# Patient Record
Sex: FEMALE | Race: WHITE | NOT HISPANIC OR LATINO | URBAN - METROPOLITAN AREA
[De-identification: names, ages, dates, MRNs, and addresses within clinical notes are randomized per-mention and may not be internally consistent; named-entity substitution may affect disease eponyms.]

---

## 2017-02-25 PROBLEM — F98.8 ADD (ATTENTION DEFICIT DISORDER): Status: ACTIVE | Noted: 2017-02-25

## 2017-02-25 PROBLEM — F51.01 PRIMARY INSOMNIA: Status: ACTIVE | Noted: 2017-02-25

## 2017-02-25 PROBLEM — F39 MOOD DISORDER: Status: ACTIVE | Noted: 2017-02-25

## 2020-12-17 LAB
ABS NEUT (OLG): 6.33 X10(3)/MCL (ref 2.1–9.2)
APTT PPP: 29.9 SECOND(S) (ref 23.2–33.7)
BASOPHILS # BLD AUTO: 0.1 X10(3)/MCL (ref 0–0.2)
BASOPHILS NFR BLD AUTO: 1 %
EOSINOPHIL # BLD AUTO: 0.4 X10(3)/MCL (ref 0–0.9)
EOSINOPHIL NFR BLD AUTO: 3 %
ERYTHROCYTE [DISTWIDTH] IN BLOOD BY AUTOMATED COUNT: 12.3 % (ref 11.5–17)
HCT VFR BLD AUTO: 42.3 % (ref 37–47)
HGB BLD-MCNC: 12.9 GM/DL (ref 12–16)
INR PPP: 1 (ref 0–1.3)
LYMPHOCYTES # BLD AUTO: 2.7 X10(3)/MCL (ref 0.6–4.6)
LYMPHOCYTES NFR BLD AUTO: 25 %
MCH RBC QN AUTO: 29.1 PG (ref 27–31)
MCHC RBC AUTO-ENTMCNC: 30.5 GM/DL (ref 33–36)
MCV RBC AUTO: 95.3 FL (ref 80–94)
MONOCYTES # BLD AUTO: 1 X10(3)/MCL (ref 0.1–1.3)
MONOCYTES NFR BLD AUTO: 10 %
NEUTROPHILS # BLD AUTO: 6.33 X10(3)/MCL (ref 2.1–9.2)
NEUTROPHILS NFR BLD AUTO: 60 %
PLATELET # BLD AUTO: 373 X10(3)/MCL (ref 130–400)
PMV BLD AUTO: 10.8 FL (ref 9.4–12.4)
PROTHROMBIN TIME: 12.7 SECOND(S) (ref 11.1–13.7)
RBC # BLD AUTO: 4.44 X10(6)/MCL (ref 4.2–5.4)
WBC # SPEC AUTO: 10.5 X10(3)/MCL (ref 4.5–11.5)

## 2020-12-22 ENCOUNTER — HISTORICAL (OUTPATIENT)
Dept: SURGERY | Facility: HOSPITAL | Age: 30
End: 2020-12-22

## 2021-04-05 ENCOUNTER — HOSPITAL ENCOUNTER (OUTPATIENT)
Dept: MEDSURG UNIT | Facility: HOSPITAL | Age: 31
End: 2021-04-06
Attending: INTERNAL MEDICINE | Admitting: INTERNAL MEDICINE

## 2021-04-05 LAB
ABS NEUT (OLG): 4.4 X10(3)/MCL (ref 2.1–9.2)
ABS NEUT (OLG): 5.13 X10(3)/MCL (ref 2.1–9.2)
ALBUMIN SERPL-MCNC: 3.8 GM/DL (ref 3.5–5)
ALBUMIN SERPL-MCNC: 4.5 GM/DL (ref 3.5–5)
ALBUMIN/GLOB SERPL: 1 RATIO (ref 1.1–2)
ALBUMIN/GLOB SERPL: 1.1 RATIO (ref 1.1–2)
ALP SERPL-CCNC: 73 UNIT/L (ref 40–150)
ALP SERPL-CCNC: 88 UNIT/L (ref 40–150)
ALT SERPL-CCNC: 27 UNIT/L (ref 0–55)
ALT SERPL-CCNC: 27 UNIT/L (ref 0–55)
AMPHET UR QL SCN: POSITIVE
APAP SERPL-MCNC: <17.4 UG/ML (ref 17.4–30)
APPEARANCE, UA: CLEAR
AST SERPL-CCNC: 22 UNIT/L (ref 5–34)
AST SERPL-CCNC: 25 UNIT/L (ref 5–34)
B-HCG SERPL QL: NEGATIVE
BACTERIA #/AREA URNS AUTO: ABNORMAL /HPF
BARBITURATE SCN PRESENT UR: NEGATIVE
BASOPHILS # BLD AUTO: 0.1 X10(3)/MCL (ref 0–0.2)
BASOPHILS # BLD AUTO: 0.1 X10(3)/MCL (ref 0–0.2)
BASOPHILS NFR BLD AUTO: 1 %
BASOPHILS NFR BLD AUTO: 1 %
BENZODIAZ UR QL SCN: NEGATIVE
BILIRUB SERPL-MCNC: 0.3 MG/DL
BILIRUB SERPL-MCNC: 0.4 MG/DL
BILIRUB UR QL STRIP: NEGATIVE
BILIRUBIN DIRECT+TOT PNL SERPL-MCNC: 0.1 MG/DL (ref 0–0.8)
BILIRUBIN DIRECT+TOT PNL SERPL-MCNC: 0.2 MG/DL (ref 0–0.5)
BILIRUBIN DIRECT+TOT PNL SERPL-MCNC: 0.2 MG/DL (ref 0–0.5)
BILIRUBIN DIRECT+TOT PNL SERPL-MCNC: 0.2 MG/DL (ref 0–0.8)
BUN SERPL-MCNC: 4.3 MG/DL (ref 7–18.7)
BUN SERPL-MCNC: 6.1 MG/DL (ref 7–18.7)
CALCIUM SERPL-MCNC: 8.5 MG/DL (ref 8.4–10.2)
CALCIUM SERPL-MCNC: 9.7 MG/DL (ref 8.4–10.2)
CANNABINOIDS UR QL SCN: NEGATIVE
CHLORIDE SERPL-SCNC: 107 MMOL/L (ref 98–107)
CHLORIDE SERPL-SCNC: 109 MMOL/L (ref 98–107)
CK SERPL-CCNC: 87 U/L (ref 29–168)
CO2 SERPL-SCNC: 21 MMOL/L (ref 22–29)
CO2 SERPL-SCNC: 23 MMOL/L (ref 22–29)
COCAINE UR QL SCN: NEGATIVE
COLOR UR: NORMAL
CREAT SERPL-MCNC: 0.73 MG/DL (ref 0.55–1.02)
CREAT SERPL-MCNC: 0.85 MG/DL (ref 0.55–1.02)
EOSINOPHIL # BLD AUTO: 0.1 X10(3)/MCL (ref 0–0.9)
EOSINOPHIL # BLD AUTO: 0.3 X10(3)/MCL (ref 0–0.9)
EOSINOPHIL NFR BLD AUTO: 2 %
EOSINOPHIL NFR BLD AUTO: 4 %
ERYTHROCYTE [DISTWIDTH] IN BLOOD BY AUTOMATED COUNT: 13 % (ref 11.5–14.5)
ERYTHROCYTE [DISTWIDTH] IN BLOOD BY AUTOMATED COUNT: 13.2 % (ref 11.5–14.5)
ETHANOL SERPL-MCNC: 79 MG/DL
GLOBULIN SER-MCNC: 3.5 GM/DL (ref 2.4–3.5)
GLOBULIN SER-MCNC: 4.6 GM/DL (ref 2.4–3.5)
GLUCOSE (UA): NEGATIVE
GLUCOSE SERPL-MCNC: 120 MG/DL (ref 74–100)
GLUCOSE SERPL-MCNC: 86 MG/DL (ref 74–100)
HCT VFR BLD AUTO: 38 % (ref 35–46)
HCT VFR BLD AUTO: 44 % (ref 35–46)
HGB BLD-MCNC: 11.9 GM/DL (ref 12–16)
HGB BLD-MCNC: 14 GM/DL (ref 12–16)
HGB UR QL STRIP: NEGATIVE
HYALINE CASTS #/AREA URNS LPF: ABNORMAL /LPF
IMM GRANULOCYTES # BLD AUTO: 0.01 10*3/UL
IMM GRANULOCYTES # BLD AUTO: 0.03 10*3/UL
IMM GRANULOCYTES NFR BLD AUTO: 0 %
IMM GRANULOCYTES NFR BLD AUTO: 0 %
KETONES UR QL STRIP: NEGATIVE
LACTATE SERPL-SCNC: 1.32 MMOL/L (ref 0.5–2.2)
LEUKOCYTE ESTERASE UR QL STRIP: NEGATIVE
LYMPHOCYTES # BLD AUTO: 1.3 X10(3)/MCL (ref 0.6–4.6)
LYMPHOCYTES # BLD AUTO: 2.3 X10(3)/MCL (ref 0.6–4.6)
LYMPHOCYTES NFR BLD AUTO: 18 %
LYMPHOCYTES NFR BLD AUTO: 30 %
MAGNESIUM SERPL-MCNC: 2 MG/DL (ref 1.6–2.6)
MCH RBC QN AUTO: 29.4 PG (ref 26–34)
MCH RBC QN AUTO: 29.5 PG (ref 26–34)
MCHC RBC AUTO-ENTMCNC: 31.3 GM/DL (ref 31–37)
MCHC RBC AUTO-ENTMCNC: 31.8 GM/DL (ref 31–37)
MCV RBC AUTO: 92.4 FL (ref 80–100)
MCV RBC AUTO: 94.3 FL (ref 80–100)
MONOCYTES # BLD AUTO: 0.6 X10(3)/MCL (ref 0.1–1.3)
MONOCYTES # BLD AUTO: 0.6 X10(3)/MCL (ref 0.1–1.3)
MONOCYTES NFR BLD AUTO: 8 %
MONOCYTES NFR BLD AUTO: 9 %
NEUTROPHILS # BLD AUTO: 4.4 X10(3)/MCL (ref 2.1–9.2)
NEUTROPHILS # BLD AUTO: 5.13 X10(3)/MCL (ref 2.1–9.2)
NEUTROPHILS NFR BLD AUTO: 58 %
NEUTROPHILS NFR BLD AUTO: 71 %
NITRITE UR QL STRIP: NEGATIVE
OPIATES UR QL SCN: NEGATIVE
PCP UR QL: NEGATIVE
PH UR STRIP.AUTO: 5 [PH] (ref 3–11)
PH UR STRIP: 5 [PH] (ref 4.5–8)
PHOSPHATE SERPL-MCNC: 2.4 MG/DL (ref 2.3–4.7)
PLATELET # BLD AUTO: 304 X10(3)/MCL (ref 130–400)
PLATELET # BLD AUTO: 354 X10(3)/MCL (ref 130–400)
PMV BLD AUTO: 10.3 FL (ref 7.4–10.4)
PMV BLD AUTO: 10.7 FL (ref 7.4–10.4)
POTASSIUM SERPL-SCNC: 3.6 MMOL/L (ref 3.5–5.1)
POTASSIUM SERPL-SCNC: 3.8 MMOL/L (ref 3.5–5.1)
PROT SERPL-MCNC: 7.3 GM/DL (ref 6.4–8.3)
PROT SERPL-MCNC: 9.1 GM/DL (ref 6.4–8.3)
PROT UR QL STRIP: NEGATIVE
RBC # BLD AUTO: 4.03 X10(6)/MCL (ref 4–5.2)
RBC # BLD AUTO: 4.76 X10(6)/MCL (ref 4–5.2)
RBC #/AREA URNS AUTO: ABNORMAL /HPF
SALICYLATES SERPL-MCNC: <5 MG/DL
SARS-COV-2 AG RESP QL IA.RAPID: NEGATIVE
SODIUM SERPL-SCNC: 142 MMOL/L (ref 136–145)
SODIUM SERPL-SCNC: 142 MMOL/L (ref 136–145)
SP GR UR STRIP: 1.01 (ref 1–1.03)
SQUAMOUS #/AREA URNS LPF: ABNORMAL /LPF
TSH SERPL-ACNC: 2.05 UIU/ML (ref 0.35–4.94)
UROBILINOGEN UR STRIP-ACNC: NORMAL
WBC # SPEC AUTO: 7.2 X10(3)/MCL (ref 4.5–11)
WBC # SPEC AUTO: 7.6 X10(3)/MCL (ref 4.5–11)
WBC #/AREA URNS AUTO: ABNORMAL /HPF

## 2021-04-06 LAB
ABS NEUT (OLG): 3.45 X10(3)/MCL (ref 2.1–9.2)
ALBUMIN SERPL-MCNC: 3.8 GM/DL (ref 3.5–5)
ALBUMIN/GLOB SERPL: 1.1 RATIO (ref 1.1–2)
ALP SERPL-CCNC: 77 UNIT/L (ref 40–150)
ALT SERPL-CCNC: 30 UNIT/L (ref 0–55)
AST SERPL-CCNC: 26 UNIT/L (ref 5–34)
BASOPHILS # BLD AUTO: 0 X10(3)/MCL (ref 0–0.2)
BASOPHILS NFR BLD AUTO: 1 %
BILIRUB SERPL-MCNC: 0.5 MG/DL
BILIRUBIN DIRECT+TOT PNL SERPL-MCNC: 0.2 MG/DL (ref 0–0.5)
BILIRUBIN DIRECT+TOT PNL SERPL-MCNC: 0.3 MG/DL (ref 0–0.8)
BUN SERPL-MCNC: 5.2 MG/DL (ref 7–18.7)
CALCIUM SERPL-MCNC: 8.8 MG/DL (ref 8.4–10.2)
CHLORIDE SERPL-SCNC: 108 MMOL/L (ref 98–107)
CO2 SERPL-SCNC: 27 MMOL/L (ref 22–29)
CREAT SERPL-MCNC: 0.76 MG/DL (ref 0.55–1.02)
EOSINOPHIL # BLD AUTO: 0.2 X10(3)/MCL (ref 0–0.9)
EOSINOPHIL NFR BLD AUTO: 4 %
ERYTHROCYTE [DISTWIDTH] IN BLOOD BY AUTOMATED COUNT: 13.3 % (ref 11.5–14.5)
GLOBULIN SER-MCNC: 3.6 GM/DL (ref 2.4–3.5)
GLUCOSE SERPL-MCNC: 83 MG/DL (ref 74–100)
HCT VFR BLD AUTO: 38.8 % (ref 35–46)
HGB BLD-MCNC: 12.2 GM/DL (ref 12–16)
IMM GRANULOCYTES # BLD AUTO: 0.03 10*3/UL
IMM GRANULOCYTES NFR BLD AUTO: 0 %
LYMPHOCYTES # BLD AUTO: 1.6 X10(3)/MCL (ref 0.6–4.6)
LYMPHOCYTES NFR BLD AUTO: 27 %
MCH RBC QN AUTO: 29.6 PG (ref 26–34)
MCHC RBC AUTO-ENTMCNC: 31.4 GM/DL (ref 31–37)
MCV RBC AUTO: 94.2 FL (ref 80–100)
MONOCYTES # BLD AUTO: 0.6 X10(3)/MCL (ref 0.1–1.3)
MONOCYTES NFR BLD AUTO: 10 %
NEUTROPHILS # BLD AUTO: 3.45 X10(3)/MCL (ref 2.1–9.2)
NEUTROPHILS NFR BLD AUTO: 58 %
PLATELET # BLD AUTO: 292 X10(3)/MCL (ref 130–400)
PMV BLD AUTO: 10.5 FL (ref 7.4–10.4)
POTASSIUM SERPL-SCNC: 3.9 MMOL/L (ref 3.5–5.1)
PROT SERPL-MCNC: 7.4 GM/DL (ref 6.4–8.3)
RBC # BLD AUTO: 4.12 X10(6)/MCL (ref 4–5.2)
SODIUM SERPL-SCNC: 143 MMOL/L (ref 136–145)
WBC # SPEC AUTO: 6 X10(3)/MCL (ref 4.5–11)

## 2022-04-29 NOTE — OP NOTE
DATE OF SURGERY:    12/22/2020    SURGEON:  Weston Viera MD    PREOPERATIVE DIAGNOSES:    1. Chronic obstructive adenotonsillitis.  2. Chronic recurrent pansinusitis.  3. Diffuse intranasal and intrasinus polyposis.  4. Nasal septal deviation.    DESCRIPTION OF PROCEDURE:  With proper consent and information, the patient was brought to the operating room and placed on the operating table in the supine position.  After satisfactory endotracheal intubation and general anesthesia, the patient was placed asleep.  The nasopharynx was packed with 200 mg of cocaine.  The septum and sinuses were injected with approximately 2 cc of 2% Xylocaine with epinephrine.  A left hemitransfixion incision was made and the mucoperichondrium was elevated off both sides of the septum.  This was brought back to the perpendicular plate of the ethmoid.  The deviation of the nasal septum and perpendicular plate was removed, leaving a caudal and dorsal strut of approximately 1 cm.  The mucoperichondrium was reapproximated with 4-0 plain catgut and 5-0 chromic.     Attention was then placed to the sinuses.  The nose was explored endoscopically.  An infundibulotomy was done on both sides.  The anterior sinus was opened.  There was a significant amount of polypoid material and hypertrophic chronically infected mucosal membranes.  Anterior ethmoid was done.  The basal lamella was opened.  The posterior ethmoid sinuses were involved with chronic mucosal changes consistent with chronic sinusitis.  This was completely removed up to the fovea ethmoidalis.  That section was carried up superiorly into the frontal ethmoid recess.  The frontal sinus was explored and mucosal antral disease was removed from this area with a mucosal sparing technique.  The nasal frontal duct was connected to the posterior ethmoid sinus in the usual fashion.  The anterior wall of the sphenoid sinus dissection was carried inferiorly to the anterior wall of the  sphenoid sinus which was identified and the sphenoid ostium was opened.  There was mucosal antral disease and obstructive inflammatory changes and tissue in this area which was removed and cleaned out.  Using the backbiting forceps and ring curette, the maxillary sinuses were opened widely.  There was a large amount of purulent material and disease tissue that was removed.  This was all sent for pathologic evaluation.  An identical procedure was carried out on the opposite side.  Bilateral submucosal resection of the inferior turbinates was done sharply and all hypertrophic changes were removed.  The nasal sinuses were packed with Fibrin glue and Nasopore.  The patient tolerated the procedure very well.  There were no intraoperative bleeding problems or CSF leak.  The patient was     transferred back to the recovery room awake, alert and responsive.  Tissue was removed from all of the offending sinuses and sent for final pathologic specimen.        ______________________________  MD FRED Ray/SK  DD:  12/23/2020  Time:  09:49AM  DT:  12/23/2020  Time:  11:48AM  Job #:  [id number]

## 2022-04-29 NOTE — ED PROVIDER NOTES
Patient:   Katelin Newsome            MRN: [id number]            FIN: [id number]               Age:   31 years     Sex:  Female     :  1990   Associated Diagnoses:   Depression, major; Medication overdose; Suicidal ideations   Author:   Adam Keen MD      Basic Information   Time seen: Date & time 2021 02:12:00.   History source: Patient.   Arrival mode: Private vehicle.   History limitation: None.   Additional information: Chief Complaint from Nursing Triage Note : Chief Complaint   2021 1:49 CDT        Chief Complaint           SUICIDAL.  TOOK 3 CLONAZEPAM 0.5MGS TABS, 5 BUPROPION , TOOK 5 BUPROPRION XL 300MGS.  SAYS  THE  INSURANCE IS NOT PAYING FOR HER BIPOLAR MEDICATIONS  AND SHE HAS BEEN  FEELING  SUICIDAL FOR A WHILE. LETHARGIC IN TRIAGE AND DROPPED HER PHONE TWICE.  .   Provider/Visit info:   Time Seen:  Adam Keen MD / 2021 02:12  .   History of Present Illness   The patient presents with psychiatric problem, suicidal ideation and depression.  The onset was 2  weeks ago and gradual.  The course/duration of symptoms is constant.  Character of symptoms depressed suicidal thoughts.  Self injury: ingestion.  There are exacerbating factors including family problems and financial problems.  Risk factors consist of suicide risk.  Prior episodes: occasional.  Therapy today: none.  Associated symptoms: denies fever, denies chills, denies nausea and denies vomiting.  Additional history: eligible for legal hold.      31-year-old female presents emergency room with suicidal ideation.  Reports major depression, and has been unable to afford her bipolar medications.  Patient began taking her medications tonight, and continue taking her medication, not caring, and feeling more suicidal.  Patient took a total of 3 0.5 mg Klonopin's, [id number] Bupropion SR, [id number] Buproprion XL at 8:30pm last night (6 hrs prior to presentation). .        Review of Systems    Constitutional symptoms:  No fever, no chills.    Respiratory symptoms:  No shortness of breath, no orthopnea.    Cardiovascular symptoms:  No chest pain, no palpitations.    Gastrointestinal symptoms:  No abdominal pain, no nausea, no vomiting.    Genitourinary symptoms:  No dysuria, no hematuria.    Psychiatric symptoms:  Depression.             Additional review of systems information: All other systems reviewed and otherwise negative.      Health Status   Allergies:    Allergic Reactions (Selected)  Severity Not Documented  Penicillins- Hives.,    Allergies (1) Active Reaction  penicillins Hives  .   Medications:  (Selected)   Documented Medications  Documented  Viibryd 20 mg oral tablet: 20 mg = 1 tab(s), Oral, Daily  Vraylar 1.5 mg oral capsule: = 1 tab(s), Oral, Daily, 0 Refill(s).      Past Medical/ Family/ Social History   Medical history:    No active or resolved past medical history items have been selected or recorded., Reviewed as documented in chart.   Surgical history:    FESS Septo Turb Cautery (Bilateral) on 12/22/2020 at 30 Years.  Comments:  12/22/2020 11:32 Clarissa Olson RN  auto-populated from documented surgical case  Tonsil / Adenoid w/KTP Laser (Bilateral) on 12/22/2020 at 30 Years.  Comments:  12/22/2020 11:32 Clarissa Olson RN  auto-populated from documented surgical case  Vaginal hysterectomy, for uterus 250 g or less; ([id number])., Reviewed as documented in chart.   Family history:    No family history items have been selected or recorded., Reviewed as documented in chart.   Social history:    Social & Psychosocial Habits    Alcohol  12/18/2020  Use: Never    Substance Use  12/18/2020  Use: Never    Tobacco  12/22/2020  Use: 4 or less cigarettes(less    Patient Wants Consult For Cessation Counseling No    Abuse/Neglect  12/22/2020  SHX Any signs of abuse or neglect No    Feels unsafe at home: No    Safe place to go: Yes    Spiritual/Cultural  12/22/2020  Rastafari  Preference Moravian  , Reviewed as documented in chart.   Problem list:    Active Problems (5)  Bipolar   Obesity   Panic attacks   Tobacco user   Tonsillitis   .      Physical Examination               Vital Signs   Vital Signs   4/5/2021 1:49 CDT        Temperature Oral          36.3 DegC                             Temperature Oral (calculated)             97.34 DegF                             Peripheral Pulse Rate     128 bpm  HI                             Respiratory Rate          20 br/min                             SpO2                      97 %                             Oxygen Therapy            Room air                             Systolic Blood Pressure   95 mmHg                             Diastolic Blood Pressure  70 mmHg  .      Vital Signs (last 24 hrs)_____  Last Charted___________  Temp Oral     36.3 DegC  (APR 05 01:49)  Heart Rate Peripheral   H 128bpm  (APR 05 01:49)  Resp Rate         20 br/min  (APR 05 01:49)  SBP      95 mmHg  (APR 05 01:49)  DBP      70 mmHg  (APR 05 01:49)  SpO2      97 %  (APR 05 01:49)  .   Measurements   4/5/2021 1:49 CDT        Weight Dosing             95 kg                             Weight Measured and Calculated in Lbs     209.44 lb                             Weight Estimated          95 kg                             Height/Length Dosing      162.56 cm                             Height/Length Estimated   162.56 cm                             Body Mass Index Estimated 35.95 kg/m2  .   Basic Oxygen Information   4/5/2021 1:49 CDT        SpO2                      97 %                             Oxygen Therapy            Room air  .   General:  Alert, no acute distress.    Allison coma scale:  Eye response: 4 /4, verbal response: 5 /5, motor response: 5 /6, Total score: Total score: 15.    Neurological:  Alert and oriented to person, place, time, and situation, No focal neurological deficit observed.    Skin:  Intact, moist.    Head:  Normocephalic, atraumatic.     Neck:  Supple, trachea midline, no tenderness.    Eye:  Pupils are equal, round and reactive to light, extraocular movements are intact, normal conjunctiva.    Ears, nose, mouth and throat:  Oral mucosa moist, no pharyngeal erythema or exudate.    Cardiovascular:  Regular rate and rhythm, No murmur, Normal peripheral perfusion.    Respiratory:  Lungs are clear to auscultation, respirations are non-labored, breath sounds are equal, Symmetrical chest wall expansion.    Gastrointestinal:  Soft, Nontender, Non distended, Normal bowel sounds.    Psychiatric:  Cooperative, Mood and affect: Depressed, tearful, Abnormal / Psychotic thoughts: Suicidal.       Medical Decision Making   Rationale:  Due to suicidal ideation and attempted overdose, patient has been placed under a [facility] for inpatient psychiatric evaluation and treatment..   Documents reviewed:  Emergency department nurses' notes.   Results review:  Lab results : [facility]   4/5/2021 3:11 CDT        Est Creat Clearance Ser   84.33 mL/min    4/5/2021 2:13 CDT        Sodium Lvl                142 mmol/L                             Potassium Lvl             3.6 mmol/L                             Chloride                  107 mmol/L                             CO2                       21 mmol/L  LOW                             Calcium Lvl               9.7 mg/dL                             Glucose Lvl               86 mg/dL                             BUN                       6.1 mg/dL  LOW                             Creatinine                0.85 mg/dL                             eGFR-AA                   102                             eGFR-MICHAEL                  84 mL/min/1.73 m2  LOW                             Bili Total                0.3 mg/dL                             Bili Direct               0.2 mg/dL                             Bili Indirect             0.10 mg/dL                             AST                       25 unit/L                              ALT                       27 unit/L                             Alk Phos                  88 unit/L                             Total Protein             9.1 gm/dL  HI                             Albumin Lvl               4.5 gm/dL                             Globulin                  4.6 gm/dL  HI                             A/G Ratio                 1.0 ratio  LOW                             TSH                       2.0536 uIU/mL                             WBC                       7.6 x10(3)/mcL                             RBC                       4.76 x10(6)/mcL                             Hgb                       14.0 gm/dL                             Hct                       44.0 %                             Platelet                  354 x10(3)/mcL                             MCV                       92.4 fL                             MCH                       29.4 pg                             MCHC                      31.8 gm/dL                             RDW                       13.0 %                             MPV                       10.7 fL  HI                             Abs Neut                  4.40 x10(3)/mcL                             Neutro Auto               58 %  NA                             Lymph Auto                30 %  NA                             Mono Auto                 8 %  NA                             Eos Auto                  4 %  NA                             Abs Eos                   0.3 x10(3)/mcL                             Basophil Auto             1 %  NA                             Abs Neutro                4.40 x10(3)/mcL                             Abs Lymph                 2.3 x10(3)/mcL                             Abs Mono                  0.6 x10(3)/mcL                             Abs Baso                  0.1 x10(3)/mcL                             IG%                       0 %  NA                             IG#                       0.010  NA                              Acetaminoph Lvl           <17.4 ug/ml                             Salicylate Lvl            <5.0 mg/dL  NA                             Ethanol Lvl               79.0 mg/dL  NA                             HIV Rapid Scrn            Non-Reactive                             Spec Source               Oral Fluid    , Lab results : [facility]   4/5/2021 2:13 CDT        Total CK                  87 U/L    .       Reexamination/ Reevaluation   Time: 4/5/2021 03:29:00 .   Interventions: Order Profile (Selected)   Inpatient Orders  Completed  Magnesium Sulfate 2gm/50ml IVPB (Premix): 2 gm, form: Infusion, IV Piggyback, Now, Infuse over: 1 hr, first dose 04/05/21 3:29:00 CDT, stop date 04/05/21 3:29:00 CDT, STAT  .   Notes: Poison control recommends 2gm of Magnesium to be given.  .   Time: 4/5/2021 06:14:00 .   Notes: Poison control requests CPK level given intermittent tremors to evaluate for rhabdomyolysis.  .   Time: 4/5/2021 06:41:00 .   Notes: Patient in no distress, currently awaiting on CPK level.  Patient has intermittent hallucinations, though has remained awake and alert..      Impression and Plan   Diagnosis   Depression, major (COX66-AE F32.9)   Medication overdose (WFB07-CI T50.901A)   Suicidal ideations (HFX85-HT R45.851)   Plan   Condition: Stable.    Disposition: Patient care transitioned to: Time: 4/5/2021 07:00:00, Sebastián YOUNGBLOOD, Campos RADER, Dispositioned by: Time: 4/5/2021 03:17:00, Osmani YOUNGBLOOD, Adam BERUMEN, Transfer for inpatient psychiatric evaluation and treatment..    Counseled: Patient, Regarding diagnosis, Regarding treatment plan, Patient indicated understanding of instructions.

## 2022-04-29 NOTE — ED PROVIDER NOTES
Patient:   Katelin Newsome            MRN: [id number]            FIN: [id number]               Age:   31 years     Sex:  Female     :  1990   Associated Diagnoses:   Depression, major; Medication overdose; Suicidal ideations   Author:   Sebastián YOUNGBLOOD, Campos RADER      Basic Information   Time seen: Date & time 2021 02:12:00.   History source: Patient.   Arrival mode: Private vehicle.   History limitation: None.   Additional information: Chief Complaint from Nursing Triage Note : Chief Complaint   2021 1:49 CDT        Chief Complaint           SUICIDAL.  TOOK 3 CLONAZEPAM 0.5MGS TABS, 5 BUPROPION , TOOK 5 BUPROPRION XL 300MGS.  SAYS  THE  INSURANCE IS NOT PAYING FOR HER BIPOLAR MEDICATIONS  AND SHE HAS BEEN  FEELING  SUICIDAL FOR A WHILE. LETHARGIC IN TRIAGE AND DROPPED HER PHONE TWICE.  .   Provider/Visit info:   Time Seen:  Adam Keen MD / 2021 02:12  .   History of Present Illness   The patient presents with psychiatric problem, suicidal ideation and depression.  The onset was 2  weeks ago and gradual.  The course/duration of symptoms is constant.  Character of symptoms depressed suicidal thoughts.  Self injury: ingestion.  There are exacerbating factors including family problems and financial problems.  Risk factors consist of suicide risk.  Prior episodes: occasional.  Therapy today: none.  Associated symptoms: denies fever, denies chills, denies nausea and denies vomiting.  Additional history: eligible for legal hold.      31-year-old female presents emergency room with suicidal ideation.  Reports major depression, and has been unable to afford her bipolar medications.  Patient began taking her medications tonight, and continue taking her medication, not caring, and feeling more suicidal.  Patient took a total of 3 0.5 mg Klonopin's, 1l172np Bupropion SR, 5x336wd Buproprion XL at 8:30pm last night (6 hrs prior to presentation). .        Review of Systems    Constitutional symptoms:  No fever, no chills.    Respiratory symptoms:  No shortness of breath, no orthopnea.    Cardiovascular symptoms:  No chest pain, no palpitations.    Gastrointestinal symptoms:  No abdominal pain, no nausea, no vomiting.    Genitourinary symptoms:  No dysuria, no hematuria.    Psychiatric symptoms:  Depression.             Additional review of systems information: All other systems reviewed and otherwise negative.      Health Status   Allergies:    Allergic Reactions (Selected)  Severity Not Documented  Penicillins- Hives.,    Allergies (1) Active Reaction  penicillins Hives  .   Medications:  (Selected)   Documented Medications  Documented  Viibryd 20 mg oral tablet: 20 mg = 1 tab(s), Oral, Daily  Vraylar 1.5 mg oral capsule: = 1 tab(s), Oral, Daily, 0 Refill(s).      Past Medical/ Family/ Social History   Medical history:    No active or resolved past medical history items have been selected or recorded., Reviewed as documented in chart.   Surgical history:    FESS Septo Turb Cautery (Bilateral) on 12/22/2020 at 30 Years.  Comments:  12/22/2020 11:32 Clarissa Olson RN  auto-populated from documented surgical case  Tonsil / Adenoid w/KTP Laser (Bilateral) on 12/22/2020 at 30 Years.  Comments:  12/22/2020 11:32 Clarissa Olson RN  auto-populated from documented surgical case  Vaginal hysterectomy, for uterus 250 g or less; (86393)., Reviewed as documented in chart.   Family history:    No family history items have been selected or recorded., Reviewed as documented in chart.   Social history:    Social & Psychosocial Habits    Alcohol  12/18/2020  Use: Never    Substance Use  12/18/2020  Use: Never    Tobacco  12/22/2020  Use: 4 or less cigarettes(less    Patient Wants Consult For Cessation Counseling No    Abuse/Neglect  12/22/2020  SHX Any signs of abuse or neglect No    Feels unsafe at home: No    Safe place to go: Yes    Spiritual/Cultural  12/22/2020  Hindu  Preference Sikhism  , Reviewed as documented in chart.   Problem list:    Active Problems (5)  Bipolar   Obesity   Panic attacks   Tobacco user   Tonsillitis   .      Physical Examination               Vital Signs   Vital Signs   4/5/2021 10:45 CDT       Temperature Oral          36.9 DegC                             Temperature Oral (calculated)             98.42 DegF                             Peripheral Pulse Rate     81 bpm                             Respiratory Rate          19 br/min                             SpO2                      99 %                             Oxygen Therapy            Room air                             Systolic Blood Pressure   95 mmHg                             Diastolic Blood Pressure  69 mmHg                             Mean Arterial Pressure, Cuff              78 mmHg    4/5/2021 7:00 CDT        Temperature Oral          36.8 DegC                             Temperature Oral (calculated)             98.24 DegF                             Peripheral Pulse Rate     88 bpm                             Respiratory Rate          18 br/min                             SpO2                      100 %                             Oxygen Therapy            Room air                             Systolic Blood Pressure   112 mmHg                             Diastolic Blood Pressure  54 mmHg  LOW                             Mean Arterial Pressure, Cuff              73 mmHg    4/5/2021 6:30 CDT        Respiratory Rate          11 br/min  LOW                             Respiratory Rate          11 br/min  LOW                             SpO2                      99 %                             SpO2                      99 %                             Oxygen Therapy            Room air    4/5/2021 6:08 CDT        Peripheral Pulse Rate     99 bpm                             Respiratory Rate          11 br/min  LOW                             Respiratory Rate          11 br/min  LOW                              SpO2                      100 %                             SpO2                      100 %                             Oxygen Therapy            Room air                             Systolic Blood Pressure   109 mmHg                             Diastolic Blood Pressure  75 mmHg                             Blood Pressure Location   Left arm                             Blood Pressure Cuff Size  Adult    4/5/2021 5:30 CDT        Respiratory Rate          22 br/min                             Respiratory Rate          22 br/min                             SpO2                      95 %                             SpO2                      95 %                             Oxygen Therapy            Room air    4/5/2021 5:00 CDT        Respiratory Rate          20 br/min                             Respiratory Rate          20 br/min                             SpO2                      90 %  LOW                             SpO2                      90 %  LOW                             Oxygen Therapy            Room air    4/5/2021 4:30 CDT        Peripheral Pulse Rate     97 bpm                             Respiratory Rate          16 br/min                             Respiratory Rate          16 br/min                             SpO2                      97 %                             SpO2                      97 %                             Oxygen Therapy            Room air                             Systolic Blood Pressure   104 mmHg                             Diastolic Blood Pressure  69 mmHg                             Blood Pressure Location   Left arm                             Blood Pressure Cuff Size  Adult    4/5/2021 4:00 CDT        Temperature Oral          36.6 DegC                             Temperature Oral (calculated)             97.88 DegF                             Peripheral Pulse Rate     112 bpm  HI                             Respiratory Rate          15 br/min                              Respiratory Rate          15 br/min                             SpO2                      99 %                             SpO2                      99 %                             Oxygen Therapy            Room air    4/5/2021 3:20 CDT        Peripheral Pulse Rate     123 bpm  HI                             Respiratory Rate          15 br/min                             Respiratory Rate          15 br/min                             SpO2                      100 %                             SpO2                      100 %                             Oxygen Therapy            Room air                             Systolic Blood Pressure   101 mmHg                             Diastolic Blood Pressure  83 mmHg                             Blood Pressure Location   Left arm                             Blood Pressure Cuff Size  Adult    4/5/2021 3:00 CDT        Peripheral Pulse Rate     121 bpm  HI                             Respiratory Rate          13 br/min                             Respiratory Rate          13 br/min                             SpO2                      98 %                             SpO2                      98 %                             Oxygen Therapy            Room air                             Systolic Blood Pressure   105 mmHg                             Blood Pressure Location   Left arm                             Blood Pressure Cuff Size  Adult                             Systolic Blood Pressure Invasive          105 mmHg                             Diastolic Blood Pressure Invasive         71 mmHg    4/5/2021 2:45 CDT        SpO2                      100 %                             Oxygen Therapy            Room air    4/5/2021 2:30 CDT        Peripheral Pulse Rate     105 bpm  HI                             Respiratory Rate          12 br/min                             SpO2                      100 %                             Oxygen Therapy             Room air                             Systolic Blood Pressure   113 mmHg                             Diastolic Blood Pressure  74 mmHg                             Mean Arterial Pressure, Cuff              87 mmHg                             Blood Pressure Location   Left arm                             Blood Pressure Cuff Size  Adult    4/5/2021 1:49 CDT        Temperature Oral          36.3 DegC                             Temperature Oral (calculated)             97.34 DegF                             Peripheral Pulse Rate     128 bpm  HI                             Respiratory Rate          20 br/min                             SpO2                      97 %                             Oxygen Therapy            Room air                             Systolic Blood Pressure   95 mmHg                             Diastolic Blood Pressure  70 mmHg  .      Vital Signs (last 24 hrs)_____  Last Charted___________  Temp Oral     36.9 DegC  (APR 05 10:45)  Heart Rate Peripheral   81 bpm  (APR 05 10:45)  Resp Rate         19 br/min  (APR 05 10:45)  SBP      95 mmHg  (APR 05 10:45)  DBP      69 mmHg  (APR 05 10:45)  SpO2      99 %  (APR 05 10:45)  .   Measurements   4/5/2021 1:49 CDT        Weight Dosing             95 kg                             Weight Measured and Calculated in Lbs     209.44 lb                             Weight Estimated          95 kg                             Height/Length Dosing      162.56 cm                             Height/Length Estimated   162.56 cm                             Body Mass Index Estimated 35.95 kg/m2  .   Basic Oxygen Information   4/5/2021 10:45 CDT       SpO2                      99 %                             Oxygen Therapy            Room air    4/5/2021 7:00 CDT        SpO2                      100 %                             Oxygen Therapy            Room air    4/5/2021 6:30 CDT        SpO2                      99 %                             SpO2                       99 %                             Oxygen Therapy            Room air    4/5/2021 6:08 CDT        SpO2                      100 %                             SpO2                      100 %                             Oxygen Therapy            Room air    4/5/2021 5:30 CDT        SpO2                      95 %                             SpO2                      95 %                             Oxygen Therapy            Room air    4/5/2021 5:00 CDT        SpO2                      90 %  LOW                             SpO2                      90 %  LOW                             Oxygen Therapy            Room air    4/5/2021 4:30 CDT        SpO2                      97 %                             SpO2                      97 %                             Oxygen Therapy            Room air    4/5/2021 4:00 CDT        SpO2                      99 %                             SpO2                      99 %                             Oxygen Therapy            Room air    4/5/2021 3:20 CDT        SpO2                      100 %                             SpO2                      100 %                             Oxygen Therapy            Room air    4/5/2021 3:00 CDT        SpO2                      98 %                             SpO2                      98 %                             Oxygen Therapy            Room air    4/5/2021 2:45 CDT        SpO2                      100 %                             Oxygen Therapy            Room air    4/5/2021 2:30 CDT        SpO2                      100 %                             Oxygen Therapy            Room air    4/5/2021 1:49 CDT        SpO2                      97 %                             Oxygen Therapy            Room air  .   General:  Alert, no acute distress.    Rifton coma scale:  Eye response: 4 /4, verbal response: 5 /5, motor response: 5 /6, Total score: Total score: 15.    Neurological:  Alert and oriented to person, place, time, and  situation, No focal neurological deficit observed.    Skin:  Intact, moist.    Head:  Normocephalic, atraumatic.    Neck:  Supple, trachea midline, no tenderness.    Eye:  Pupils are equal, round and reactive to light, extraocular movements are intact, normal conjunctiva.    Ears, nose, mouth and throat:  Oral mucosa moist, no pharyngeal erythema or exudate.    Cardiovascular:  Regular rate and rhythm, No murmur, Normal peripheral perfusion.    Respiratory:  Lungs are clear to auscultation, respirations are non-labored, breath sounds are equal, Symmetrical chest wall expansion.    Gastrointestinal:  Soft, Nontender, Non distended, Normal bowel sounds.    Psychiatric:  Cooperative, Mood and affect: Depressed, tearful, Abnormal / Psychotic thoughts: Suicidal.       Medical Decision Making   Rationale:  Due to suicidal ideation and attempted overdose, patient has been placed under a PEC for inpatient psychiatric evaluation and treatment..   Documents reviewed:  Emergency department nurses' notes.   Results review:     Labs (Last four charted values)  WBC                  7.6 (APR 05)   Hgb                  14.0 (APR 05)   Hct                  44.0 (APR 05)   Plt                  354 (APR 05)   Na                   142 (APR 05)   K                    3.6 (APR 05)   CO2                  L 21 (APR 05)   Cl                   107 (APR 05)   Cr                   0.85 (APR 05)   BUN                  L 6.1 (APR 05)   Glucose Random       86 (APR 05) .      Reexamination/ Reevaluation   Time: 4/5/2021 03:29:00 .   Interventions: Order Profile (Selected)   Inpatient Orders  Ordered  53980 Office/Outpatient Visit Level 2 New 31558 PC: Anxiety in acute stress reaction  Nausea, vomiting, and diarrhea, 03/22/20 0:28:00 CDT  Consult to Case Management: 04/05/21 1:59:59 CDT  Consult to Case Management: 04/05/21 2:52:04 CDT, At risk for suicide  Consult to : 04/05/21 2:52:04 CDT, At risk for suicide  Document  Kinney-Suicide Severity Rating Scale (C-SSRS): 04/05/21 1:59:59 CDT, Once  Elevate Head of Bed: 12/22/20 12:02:00 CST, Stop date 12/22/20 12:02:00 CST, Elevate HOB 35 degrees  Follow up with Primary care MD: 03/22/20 0:29:00 CDT  Suicide Precautions: 04/05/21 2:52:04 CDT, Constant Order  keep patient at [facility] in extended recovery: 12/22/20 12:02:00 CST, keep patient at [facility] in extended recovery, 12/22/20 12:02:00 CST  Ordered (In Process)  Bill Cyto Gyn TP Auto/Man Rescr 68826: [id number], 03596 [id number], RT - Routine, 02/24/17 10:11:00 CST  Bill Surg Decal 42672: [id number], 13196 [id number], N - Now, 12/22/20 10:58:00 CST  Bill Surg Decal 02776: [id number], 18516 [id number], N - Now, 12/22/20 10:58:00 CST  Bill Surg Decal 54656: [id number], 43116 [id number], N - Now, 12/22/20 10:58:00 CST  Bill Surg Decal 11615: [id number], 99891 [id number], N - Now, 12/22/20 10:58:00 CST  Bill Surg Decal 74186: [id number], 81234 [id number], N - Now, 12/22/20 10:58:00 CST  Bill Surg Decal 64423: [id number], 41247 [id number], N - Now, 12/22/20 10:58:00 CST  Bill Surg Decal 64436: [id number], 27857 [id number], N - Now, 12/22/20 10:58:00 CST  Bill Surg Decal 97002: [id number], 04644 [id number], N - Now, 12/22/20 10:58:00 CST  Bill Surg Level III 89890: [id number], 03184 [id number], N - Now, 12/22/20 10:58:00 CST  Bill Surg Level III 62558: [id number], 82410 [id number], N - Now, 12/22/20 10:58:00 CST  Bill Surg Level III 12522: [id number], 91181 [id number], N - Now, 12/22/20 10:58:00 CST  Bill Surg Level III 06419: [id number], 72786 [id number], N - Now, 12/22/20 10:58:00 CST  Bill Surg Level III 12809:  [id number], 03090 [id number], N - Now, 12/22/20 10:58:00 CST  Bill Surg Level III 12359: [id number], 66167 [id number], N - Now, 12/22/20 10:58:00 CST  Bill Surg Level III 82036: [id number], 06258 [id number], N - Now, 12/22/20 10:58:00 CST  Bill Surg Level III 74267: [id number], 54589 [id number], N - Now, 12/22/20 10:58:00 CST  Bill Surg Level III 44732: [id number], 66199 [id number], N - Now, 12/22/20 10:58:00 CST  Bill Surg Level III 58016: [id number], 65284 [id number], N - Now, 12/22/20 10:58:00 CST  Pap Stain: [id number], 64621 [id number], RT - Routine, 02/24/17 10:11:00 CST  Thin Prep Autoscreen: [id number], 22586 [id number], RT - Routine, 02/24/17 10:11:00 CST  Pending Complete (Ordered)  Vital Signs: 12/22/20 12:02:00 CST, q15min for 1 hr, Stop date 12/22/20 13:14:00 CST, Routine post-op vitals  Vital Signs: 12/22/20 12:02:00 CST, q1hr for 2 hr, Stop date 12/22/20 14:29:00 CST, Routine post-op vitals  Vital Signs: 12/22/20 12:02:00 CST, q30min for 1 hr, Stop date 12/22/20 13:29:00 CST, Routine post-op vitals  Documented Medications  Documented  Viibryd 20 mg oral tablet: 20 mg = 1 tab(s), Oral, Daily  Vraylar 1.5 mg oral capsule: = 1 tab(s), Oral, Daily, 0 Refill(s).   Notes: Poison control recommends 2gm of Magnesium to be given.   .   Time: 4/5/2021 06:14:00 .   Notes: Poison control requests CPK level given intermittent tremors to evaluate for rhabdomyolysis.   .   Time: 4/5/2021 06:41:00 .   Notes: Patient in no distress, currently awaiting on CPK level.  Patient has intermittent hallucinations, though has remained awake and alert..   Time: 4/5/2021 11:27:00 .   Notes: Patient vomited earlier but is now comfortable.  EKG is within normal limits.  Poison control was again consulted by RN.  They recommend patient be  observed for a full 24 hours for QRS widening.  Currently adding a magnesium drip..      Impression and Plan   Diagnosis   Depression, major (ADJ60-ZT F32.9)   Medication overdose (ZHA75-CB T50.901A)   Suicidal ideations (HRI81-PE R45.851)      Calls-Consults   -  4/5/2021 11:27:00 , I d/w Dr. Russell for admission.    Plan   Condition: Stable.    Disposition: Admit time  4/5/2021 11:27:00, Patient care transitioned to: Time: 4/5/2021 07:00:00, Sebastián YOUNGBLOOD, Campos RADER, Dispositioned by: Time: 4/5/2021 03:17:00, Osmani YOUNGBLOOD, Adam BERUMEN, Transfer for inpatient psychiatric evaluation and treatment..    Counseled: Patient, Regarding diagnosis, Regarding treatment plan, Patient indicated understanding of instructions.